# Patient Record
Sex: MALE | Race: WHITE | NOT HISPANIC OR LATINO | Employment: OTHER | URBAN - METROPOLITAN AREA
[De-identification: names, ages, dates, MRNs, and addresses within clinical notes are randomized per-mention and may not be internally consistent; named-entity substitution may affect disease eponyms.]

---

## 2018-02-22 ENCOUNTER — OFFICE VISIT CONVERTED (OUTPATIENT)
Dept: OTOLARYNGOLOGY | Facility: CLINIC | Age: 73
End: 2018-02-22
Attending: OTOLARYNGOLOGY

## 2018-06-01 VITALS
DIASTOLIC BLOOD PRESSURE: 81 MMHG | DIASTOLIC BLOOD PRESSURE: 78 MMHG | OXYGEN SATURATION: 100 % | HEART RATE: 82 BPM | DIASTOLIC BLOOD PRESSURE: 64 MMHG | RESPIRATION RATE: 43 BRPM | RESPIRATION RATE: 12 BRPM | OXYGEN SATURATION: 99 % | RESPIRATION RATE: 16 BRPM | TEMPERATURE: 97.6 F | RESPIRATION RATE: 10 BRPM | DIASTOLIC BLOOD PRESSURE: 53 MMHG | RESPIRATION RATE: 13 BRPM | OXYGEN SATURATION: 98 % | SYSTOLIC BLOOD PRESSURE: 102 MMHG | HEART RATE: 74 BPM | DIASTOLIC BLOOD PRESSURE: 71 MMHG | SYSTOLIC BLOOD PRESSURE: 157 MMHG | HEART RATE: 68 BPM | SYSTOLIC BLOOD PRESSURE: 126 MMHG | HEART RATE: 78 BPM | TEMPERATURE: 98 F | SYSTOLIC BLOOD PRESSURE: 101 MMHG | DIASTOLIC BLOOD PRESSURE: 58 MMHG | HEART RATE: 75 BPM | SYSTOLIC BLOOD PRESSURE: 136 MMHG | SYSTOLIC BLOOD PRESSURE: 148 MMHG | WEIGHT: 160 LBS | HEART RATE: 66 BPM | HEIGHT: 67 IN | RESPIRATION RATE: 18 BRPM | DIASTOLIC BLOOD PRESSURE: 74 MMHG

## 2018-06-04 ENCOUNTER — AMBULATORY SURGICAL CENTER (AMBULATORY)
Dept: URBAN - METROPOLITAN AREA SURGERY 17 | Facility: SURGERY | Age: 73
End: 2018-06-04

## 2018-06-04 ENCOUNTER — OFFICE (AMBULATORY)
Dept: URBAN - METROPOLITAN AREA PATHOLOGY 4 | Facility: PATHOLOGY | Age: 73
End: 2018-06-04

## 2018-06-04 DIAGNOSIS — D12.3 BENIGN NEOPLASM OF TRANSVERSE COLON: ICD-10-CM

## 2018-06-04 DIAGNOSIS — Z86.010 PERSONAL HISTORY OF COLONIC POLYPS: ICD-10-CM

## 2018-06-04 DIAGNOSIS — D12.0 BENIGN NEOPLASM OF CECUM: ICD-10-CM

## 2018-06-04 DIAGNOSIS — K62.1 RECTAL POLYP: ICD-10-CM

## 2018-06-04 LAB
GI HISTOLOGY: A. UNSPECIFIED: (no result)
GI HISTOLOGY: B. UNSPECIFIED: (no result)
GI HISTOLOGY: C. UNSPECIFIED: (no result)
GI HISTOLOGY: PDF REPORT: (no result)

## 2018-06-04 PROCEDURE — 88305 TISSUE EXAM BY PATHOLOGIST: CPT | Mod: PT

## 2018-06-04 PROCEDURE — 45385 COLONOSCOPY W/LESION REMOVAL: CPT | Mod: PT

## 2018-06-04 RX ADMIN — PROPOFOL 25 MG: 10 INJECTION, EMULSION INTRAVENOUS at 10:18

## 2018-06-04 RX ADMIN — PROPOFOL 75 MG: 10 INJECTION, EMULSION INTRAVENOUS at 10:11

## 2018-06-04 RX ADMIN — PROPOFOL 25 MG: 10 INJECTION, EMULSION INTRAVENOUS at 10:22

## 2018-06-04 RX ADMIN — PROPOFOL 25 MG: 10 INJECTION, EMULSION INTRAVENOUS at 10:16

## 2018-06-04 RX ADMIN — PROPOFOL 25 MG: 10 INJECTION, EMULSION INTRAVENOUS at 10:13

## 2018-06-04 RX ADMIN — PROPOFOL 25 MG: 10 INJECTION, EMULSION INTRAVENOUS at 10:14

## 2018-06-04 RX ADMIN — PROPOFOL 25 MG: 10 INJECTION, EMULSION INTRAVENOUS at 10:20

## 2019-03-25 ENCOUNTER — HOSPITAL ENCOUNTER (OUTPATIENT)
Dept: OTHER | Facility: HOSPITAL | Age: 74
Discharge: HOME | End: 2019-03-25
Attending: FAMILY MEDICINE

## 2019-08-15 ENCOUNTER — HOSPITAL ENCOUNTER (OUTPATIENT)
Dept: OTHER | Facility: HOSPITAL | Age: 74
Discharge: HOME | End: 2019-08-15
Attending: FAMILY MEDICINE

## 2019-08-15 LAB
CREAT BLD-MCNC: 1 MG/DL (ref 0.6–1.4)
GFR SERPLBLD BASED ON 1.73 SQ M-ARVRAT: >60 ML/MIN/{1.73_M2}

## 2019-11-07 ENCOUNTER — HOSPITAL ENCOUNTER (OUTPATIENT)
Dept: OTHER | Facility: HOSPITAL | Age: 74
Discharge: HOME | End: 2019-11-07

## 2019-11-07 LAB
CREAT BLD-MCNC: 0.9 MG/DL (ref 0.6–1.4)
GFR SERPLBLD BASED ON 1.73 SQ M-ARVRAT: >60 ML/MIN/{1.73_M2}

## 2019-12-18 ENCOUNTER — HOSPITAL ENCOUNTER (OUTPATIENT)
Dept: NUCLEAR MEDICINE | Facility: HOSPITAL | Age: 74
Discharge: HOME | End: 2019-12-18
Attending: FAMILY MEDICINE

## 2020-01-09 ENCOUNTER — HOSPITAL ENCOUNTER (OUTPATIENT)
Dept: OTHER | Facility: HOSPITAL | Age: 75
Discharge: HOME | End: 2020-01-09
Attending: FAMILY MEDICINE

## 2020-01-20 ENCOUNTER — HOSPITAL ENCOUNTER (OUTPATIENT)
Dept: OTHER | Facility: HOSPITAL | Age: 75
Discharge: HOME | End: 2020-01-20
Attending: FAMILY MEDICINE

## 2020-02-12 ENCOUNTER — HOSPITAL ENCOUNTER (OUTPATIENT)
Dept: OTHER | Facility: HOSPITAL | Age: 75
Discharge: HOME | End: 2020-02-12
Attending: FAMILY MEDICINE

## 2020-02-20 ENCOUNTER — OFFICE VISIT CONVERTED (OUTPATIENT)
Dept: OTOLARYNGOLOGY | Facility: CLINIC | Age: 75
End: 2020-02-20
Attending: OTOLARYNGOLOGY

## 2020-07-23 ENCOUNTER — HOSPITAL ENCOUNTER (OUTPATIENT)
Dept: OTHER | Facility: HOSPITAL | Age: 75
Discharge: HOME | End: 2020-07-23
Attending: FAMILY MEDICINE

## 2021-05-15 VITALS
TEMPERATURE: 97.7 F | BODY MASS INDEX: 24.98 KG/M2 | RESPIRATION RATE: 16 BRPM | OXYGEN SATURATION: 99 % | SYSTOLIC BLOOD PRESSURE: 124 MMHG | HEART RATE: 71 BPM | WEIGHT: 159.12 LBS | DIASTOLIC BLOOD PRESSURE: 68 MMHG | HEIGHT: 67 IN

## 2021-05-16 VITALS
SYSTOLIC BLOOD PRESSURE: 148 MMHG | DIASTOLIC BLOOD PRESSURE: 82 MMHG | HEART RATE: 82 BPM | RESPIRATION RATE: 16 BRPM | OXYGEN SATURATION: 97 % | TEMPERATURE: 98.3 F

## 2023-06-15 ENCOUNTER — HOSPITAL ENCOUNTER (OUTPATIENT)
Dept: CT IMAGING | Facility: HOSPITAL | Age: 78
Discharge: HOME | End: 2023-06-15
Admitting: NURSE PRACTITIONER
Payer: MEDICARE

## 2023-06-15 ENCOUNTER — TRANSCRIBE ORDERS (OUTPATIENT)
Dept: ADMINISTRATIVE | Facility: HOSPITAL | Age: 78
End: 2023-06-15
Payer: MEDICARE

## 2023-06-15 ENCOUNTER — TRANSCRIBE ORDERS (OUTPATIENT)
Dept: CT IMAGING | Facility: HOSPITAL | Age: 78
End: 2023-06-15
Payer: MEDICARE

## 2023-06-15 DIAGNOSIS — R10.32 LEFT LOWER QUADRANT PAIN: ICD-10-CM

## 2023-06-15 DIAGNOSIS — R10.32 LEFT LOWER QUADRANT PAIN: Primary | ICD-10-CM

## 2023-06-15 LAB
CREAT BLDA-MCNC: 1 MG/DL
EGFRCR SERPLBLD CKD-EPI 2021: 77 ML/MIN/1.73

## 2023-06-15 PROCEDURE — 82565 ASSAY OF CREATININE: CPT

## 2023-06-15 PROCEDURE — 74177 CT ABD & PELVIS W/CONTRAST: CPT

## 2023-06-15 PROCEDURE — 25510000001 IOPAMIDOL PER 1 ML: Performed by: NURSE PRACTITIONER

## 2023-06-15 RX ADMIN — IOPAMIDOL 100 ML: 755 INJECTION, SOLUTION INTRAVENOUS at 11:00

## 2023-08-23 ENCOUNTER — OFFICE VISIT (OUTPATIENT)
Dept: SURGERY | Facility: CLINIC | Age: 78
End: 2023-08-23
Payer: MEDICARE

## 2023-08-23 ENCOUNTER — PREP FOR SURGERY (OUTPATIENT)
Dept: OTHER | Facility: HOSPITAL | Age: 78
End: 2023-08-23
Payer: MEDICARE

## 2023-08-23 VITALS — WEIGHT: 153 LBS | HEIGHT: 67 IN | HEART RATE: 74 BPM | BODY MASS INDEX: 24.01 KG/M2

## 2023-08-23 DIAGNOSIS — Z86.010 HISTORY OF COLONIC POLYPS: ICD-10-CM

## 2023-08-23 DIAGNOSIS — Z87.19 HISTORY OF DIVERTICULITIS: ICD-10-CM

## 2023-08-23 DIAGNOSIS — Z12.11 SCREENING FOR MALIGNANT NEOPLASM OF COLON: Primary | ICD-10-CM

## 2023-08-23 PROCEDURE — 1160F RVW MEDS BY RX/DR IN RCRD: CPT | Performed by: NURSE PRACTITIONER

## 2023-08-23 PROCEDURE — S0260 H&P FOR SURGERY: HCPCS | Performed by: NURSE PRACTITIONER

## 2023-08-23 PROCEDURE — 1159F MED LIST DOCD IN RCRD: CPT | Performed by: NURSE PRACTITIONER

## 2023-08-23 RX ORDER — SODIUM CHLORIDE 0.9 % (FLUSH) 0.9 %
3 SYRINGE (ML) INJECTION EVERY 12 HOURS SCHEDULED
OUTPATIENT
Start: 2023-08-23

## 2023-08-23 RX ORDER — LEVOTHYROXINE SODIUM 0.07 MG/1
1 TABLET ORAL DAILY
COMMUNITY
Start: 2023-06-05

## 2023-08-23 RX ORDER — LISINOPRIL 20 MG/1
1 TABLET ORAL DAILY
COMMUNITY
Start: 2023-06-12

## 2023-08-23 RX ORDER — SIMVASTATIN 10 MG
TABLET ORAL
COMMUNITY

## 2023-08-23 RX ORDER — CIPROFLOXACIN 500 MG/1
1 TABLET, FILM COATED ORAL EVERY 12 HOURS SCHEDULED
COMMUNITY
Start: 2023-06-15

## 2023-08-23 RX ORDER — PANTOPRAZOLE SODIUM 40 MG/1
1 TABLET, DELAYED RELEASE ORAL DAILY
COMMUNITY
Start: 2023-06-05

## 2023-08-23 RX ORDER — SODIUM PICOSULFATE, MAGNESIUM OXIDE, AND ANHYDROUS CITRIC ACID 10; 3.5; 12 MG/160ML; G/160ML; G/160ML
160 LIQUID ORAL ONCE
Qty: 2 ML | Refills: 0 | Status: SHIPPED | OUTPATIENT
Start: 2023-08-23 | End: 2023-08-23

## 2023-08-23 RX ORDER — OMEPRAZOLE 20 MG/1
20 CAPSULE, DELAYED RELEASE ORAL DAILY
COMMUNITY

## 2023-08-23 RX ORDER — SODIUM CHLORIDE 0.9 % (FLUSH) 0.9 %
10 SYRINGE (ML) INJECTION AS NEEDED
OUTPATIENT
Start: 2023-08-23

## 2023-08-23 RX ORDER — SODIUM CHLORIDE 9 MG/ML
40 INJECTION, SOLUTION INTRAVENOUS AS NEEDED
OUTPATIENT
Start: 2023-08-23

## 2023-08-23 NOTE — PROGRESS NOTES
Chief Complaint: Colonoscopy (consult)    Subjective      Colonoscopy consultation       History of Present Illness  Kennedy Cruz is a 78 y.o. male presents to [facility] for colonoscopy consultation.    Patient presents today on referral from Dr. Matthew Sampson for colonoscopy consultation.    Patient reports that he has had a diverticulitis flareup in June 2023.  He reports that he was treated by Dr. Sampson with Flagyl and Cipro.  He currently denies any abdominal pain, change in bowel habit, or rectal bleeding.  Denies any family history of colorectal cancer.  Admits to history of colonic polyps.    6/18: colonoscopy ([facility]): sub-cm adenomas removed.       Objective     Past Medical History:   Diagnosis Date    Acid reflux     Hyperlipidemia     Hypertension     Hypothyroid        Past Surgical History:   Procedure Laterality Date    APPENDECTOMY      HERNIA REPAIR         Outpatient Medications Marked as Taking for the 8/23/23 encounter (Office Visit) with Madelyn Bullock APRN   Medication Sig Dispense Refill    levothyroxine (SYNTHROID, LEVOTHROID) 75 MCG tablet Take 1 tablet by mouth Daily.      lisinopril (PRINIVIL,ZESTRIL) 20 MG tablet Take 1 tablet by mouth Daily.      pantoprazole (PROTONIX) 40 MG EC tablet Take 1 tablet by mouth Daily.      simvastatin (ZOCOR) 10 MG tablet simvastatin 10 mg oral tablet take 1 tablet (10 mg) by oral route once daily in the evening   Active         Allergies   Allergen Reactions    Morphine Seizure        Family History   Problem Relation Age of Onset    Hypertension Mother     Hypertension Father     Hypertension Sister        Social History     Socioeconomic History    Marital status:    Tobacco Use    Smoking status: Former     Types: Pipe    Smokeless tobacco: Never   Vaping Use    Vaping Use: Never used   Substance and Sexual Activity    Alcohol use: Yes     Comment: occ.    Drug use: Never    Sexual activity: Defer  "      Review of Systems   Constitutional:  Negative for chills and fever.   Gastrointestinal:  Negative for abdominal distention, abdominal pain, anal bleeding, blood in stool, constipation, diarrhea and rectal pain.      Vital Signs:   Pulse 74   Ht 170.2 cm (67\")   Wt 69.4 kg (153 lb)   BMI 23.96 kg/mý      Physical Exam  Vitals and nursing note reviewed.   Constitutional:       General: He is not in acute distress.     Appearance: Normal appearance.   HENT:      Head: Normocephalic.   Cardiovascular:      Rate and Rhythm: Normal rate.   Pulmonary:      Effort: Pulmonary effort is normal.      Breath sounds: No stridor.   Abdominal:      Palpations: Abdomen is soft.      Tenderness: There is no guarding.   Musculoskeletal:         General: No deformity. Normal range of motion.   Skin:     General: Skin is warm and dry.      Coloration: Skin is not jaundiced.   Neurological:      General: No focal deficit present.      Mental Status: He is alert and oriented to person, place, and time.   Psychiatric:         Mood and Affect: Mood normal.         Thought Content: Thought content normal.        Result Review :          []  Laboratory  []  Radiology  []  Pathology  []  Microbiology  []  EKG/Telemetry   []  Cardiology/Vascular   []  Old records  I spent 15 minutes caring for Kennedy on this date of service. This time includes time spent by me in the following activities: reviewing tests, obtaining and/or reviewing a separately obtained history, performing a medically appropriate examination and/or evaluation, ordering medications, tests, or procedures, and documenting information in the medical record.     Assessment and Plan    Diagnoses and all orders for this visit:    1. Screening for malignant neoplasm of colon (Primary)    2. History of colonic polyps    3. History of diverticulitis    Other orders  -     Sod Picosulfate-Mag Ox-Cit Acd (Clenpiq) 10-3.5-12 MG-GM -GM/160ML solution; Take 160 mL by mouth 1 (One) " Time for 1 dose.  Dispense: 2 mL; Refill: 0        Follow Up   Return for Schedule colonoscopy with Dr. Suarez on 1/17/2024 [facility], [place], operated by [facility].    Hospital arrival time: 10:00.    Possible risks/complications, benefits, and alternatives to surgical or invasive procedures have been explained to patient and/or legal guardian.    Patient has been evaluated and can tolerate anesthesia and/or sedation. Risks, benefits, and alternatives to anesthesia and sedation have been explained to the patient and/or legal guardian. Patient verbalizes understanding and is willing to proceed with the above plan.     Patient was given instructions and counseling regarding his condition or for health maintenance advice. Please see specific information pulled into the AVS if appropriate.

## 2023-10-11 ENCOUNTER — TRANSCRIBE ORDERS (OUTPATIENT)
Dept: ADMINISTRATIVE | Facility: HOSPITAL | Age: 78
End: 2023-10-11
Payer: MEDICARE

## 2023-10-11 DIAGNOSIS — R07.9 CHEST PAIN, UNSPECIFIED TYPE: Primary | ICD-10-CM

## 2023-10-23 ENCOUNTER — HOSPITAL ENCOUNTER (OUTPATIENT)
Dept: CT IMAGING | Facility: HOSPITAL | Age: 78
Discharge: HOME | End: 2023-10-23
Admitting: FAMILY MEDICINE
Payer: MEDICARE

## 2023-10-23 DIAGNOSIS — R07.9 CHEST PAIN, UNSPECIFIED TYPE: ICD-10-CM

## 2023-10-23 LAB
CREAT BLDA-MCNC: 1 MG/DL
EGFRCR SERPLBLD CKD-EPI 2021: 77 ML/MIN/1.73

## 2023-10-23 PROCEDURE — 25510000001 IOPAMIDOL PER 1 ML: Performed by: FAMILY MEDICINE

## 2023-10-23 PROCEDURE — 71260 CT THORAX DX C+: CPT

## 2023-10-23 PROCEDURE — 82565 ASSAY OF CREATININE: CPT

## 2023-10-23 RX ADMIN — IOPAMIDOL 100 ML: 755 INJECTION, SOLUTION INTRAVENOUS at 18:53

## 2024-01-10 NOTE — SIGNIFICANT NOTE
Arrival time of 1000.    Must have a  for transportation post prodecure, as well as photo ID and insurance card.    Education provided on laxative administration; bowel prep to be taken in two doses. Reviweed diet instructions for day prior to procedure. Only plain, unflavored water after midnight until two hours prior to arrival time.    Do not take any morning medications of the day and the procedure. Instead, bring all prescribed medications and inhalers to the hospital on the morning of the procedure.     Patient verbalized understanding of instructions.

## 2024-01-12 ENCOUNTER — TELEPHONE (OUTPATIENT)
Dept: SURGERY | Facility: CLINIC | Age: 79
End: 2024-01-12
Payer: MEDICARE

## 2024-01-12 NOTE — TELEPHONE ENCOUNTER
Called pt wife back and r/s his colonoscopy to 2-14-24 with hilda in surgery scheduling. His new arrival time is 1:30pm. Pt has bowel prep instructions with no questions.

## 2024-02-07 ENCOUNTER — TELEPHONE (OUTPATIENT)
Dept: SURGERY | Facility: CLINIC | Age: 79
End: 2024-02-07
Payer: MEDICARE

## 2024-02-07 NOTE — TELEPHONE ENCOUNTER
PATIENT'S WIFE, CAMERON, CALLED.  SHE WANTS TO KNOW IF THERE IS AN EARLIER TIME THAN 1:30 THE SAME DAY HE IS SCHEDULED ON 12/14/24.    SHE SAID IT IS A LONG TIME FOR HIM TO GO WITHOUT EATING.

## 2024-02-07 NOTE — TELEPHONE ENCOUNTER
Called pt wife back. There is no earlier time on 2-14 at this time. I told her I would call them back if there was a cancellation so they could move up.

## 2024-02-09 NOTE — PRE-PROCEDURE INSTRUCTIONS
"Instructed on date and arrival time of 1330. Come to entrance \"C\". Must have  over age 18 to drive home.  May have two visitors; however, children under 12 must stay in waiting room.  Discussed clear liquid diet (no red or purple), bowel prep, and NPO.  May take medications as usual except for blood thinners, diabetic medications, and weight loss medications.  Bring list of medications.  Verbalized understanding of instructions given.  Instructed to call for questions or concerns.  Spoke with wife.  "

## 2024-02-13 ENCOUNTER — ANESTHESIA EVENT (OUTPATIENT)
Dept: GASTROENTEROLOGY | Facility: HOSPITAL | Age: 79
End: 2024-02-13
Payer: MEDICARE

## 2024-02-14 ENCOUNTER — ANESTHESIA (OUTPATIENT)
Dept: GASTROENTEROLOGY | Facility: HOSPITAL | Age: 79
End: 2024-02-14
Payer: MEDICARE

## 2024-02-14 ENCOUNTER — HOSPITAL ENCOUNTER (OUTPATIENT)
Facility: HOSPITAL | Age: 79
Setting detail: HOSPITAL OUTPATIENT SURGERY
Discharge: HOME | End: 2024-02-14
Attending: SURGERY | Admitting: SURGERY
Payer: MEDICARE

## 2024-02-14 VITALS
BODY MASS INDEX: 24.1 KG/M2 | HEART RATE: 103 BPM | TEMPERATURE: 98.6 F | SYSTOLIC BLOOD PRESSURE: 94 MMHG | RESPIRATION RATE: 16 BRPM | WEIGHT: 153.88 LBS | DIASTOLIC BLOOD PRESSURE: 57 MMHG | OXYGEN SATURATION: 97 %

## 2024-02-14 DIAGNOSIS — Z87.19 HISTORY OF DIVERTICULITIS: ICD-10-CM

## 2024-02-14 DIAGNOSIS — Z12.11 SCREENING FOR MALIGNANT NEOPLASM OF COLON: ICD-10-CM

## 2024-02-14 PROCEDURE — 25810000003 LACTATED RINGERS PER 1000 ML: Performed by: NURSE ANESTHETIST, CERTIFIED REGISTERED

## 2024-02-14 PROCEDURE — 25810000003 LACTATED RINGERS PER 1000 ML

## 2024-02-14 PROCEDURE — 25010000002 PROPOFOL 10 MG/ML EMULSION: Performed by: NURSE ANESTHETIST, CERTIFIED REGISTERED

## 2024-02-14 PROCEDURE — 88305 TISSUE EXAM BY PATHOLOGIST: CPT | Performed by: SURGERY

## 2024-02-14 DEVICE — DEV CLIP HEMO RESOLUTION360/ULTR 235CM 17MM STRL: Type: IMPLANTABLE DEVICE | Site: COLON | Status: FUNCTIONAL

## 2024-02-14 DEVICE — DEV CLIP ENDO RESOLUTION360 CONTRL ROT 235CM: Type: IMPLANTABLE DEVICE | Site: COLON | Status: FUNCTIONAL

## 2024-02-14 RX ORDER — LIDOCAINE HYDROCHLORIDE 20 MG/ML
INJECTION, SOLUTION EPIDURAL; INFILTRATION; INTRACAUDAL; PERINEURAL AS NEEDED
Status: DISCONTINUED | OUTPATIENT
Start: 2024-02-14 | End: 2024-02-14 | Stop reason: SURG

## 2024-02-14 RX ORDER — PROPOFOL 10 MG/ML
VIAL (ML) INTRAVENOUS AS NEEDED
Status: DISCONTINUED | OUTPATIENT
Start: 2024-02-14 | End: 2024-02-14 | Stop reason: SURG

## 2024-02-14 RX ORDER — SODIUM CHLORIDE 9 MG/ML
40 INJECTION, SOLUTION INTRAVENOUS AS NEEDED
Status: DISCONTINUED | OUTPATIENT
Start: 2024-02-14 | End: 2024-02-14 | Stop reason: HOSPADM

## 2024-02-14 RX ORDER — SODIUM CHLORIDE, SODIUM LACTATE, POTASSIUM CHLORIDE, CALCIUM CHLORIDE 600; 310; 30; 20 MG/100ML; MG/100ML; MG/100ML; MG/100ML
30 INJECTION, SOLUTION INTRAVENOUS CONTINUOUS
Status: DISCONTINUED | OUTPATIENT
Start: 2024-02-14 | End: 2024-02-14 | Stop reason: HOSPADM

## 2024-02-14 RX ORDER — SODIUM CHLORIDE, SODIUM LACTATE, POTASSIUM CHLORIDE, CALCIUM CHLORIDE 600; 310; 30; 20 MG/100ML; MG/100ML; MG/100ML; MG/100ML
INJECTION, SOLUTION INTRAVENOUS CONTINUOUS PRN
Status: DISCONTINUED | OUTPATIENT
Start: 2024-02-14 | End: 2024-02-14 | Stop reason: SURG

## 2024-02-14 RX ORDER — PHENYLEPHRINE HCL IN 0.9% NACL 1 MG/10 ML
SYRINGE (ML) INTRAVENOUS AS NEEDED
Status: DISCONTINUED | OUTPATIENT
Start: 2024-02-14 | End: 2024-02-14 | Stop reason: SURG

## 2024-02-14 RX ORDER — SODIUM CHLORIDE 0.9 % (FLUSH) 0.9 %
3 SYRINGE (ML) INJECTION EVERY 12 HOURS SCHEDULED
Status: DISCONTINUED | OUTPATIENT
Start: 2024-02-14 | End: 2024-02-14 | Stop reason: HOSPADM

## 2024-02-14 RX ORDER — SODIUM CHLORIDE 0.9 % (FLUSH) 0.9 %
10 SYRINGE (ML) INJECTION AS NEEDED
Status: DISCONTINUED | OUTPATIENT
Start: 2024-02-14 | End: 2024-02-14 | Stop reason: HOSPADM

## 2024-02-14 RX ORDER — EPHEDRINE SULFATE 50 MG/ML
INJECTION, SOLUTION INTRAVENOUS AS NEEDED
Status: DISCONTINUED | OUTPATIENT
Start: 2024-02-14 | End: 2024-02-14 | Stop reason: SURG

## 2024-02-14 RX ADMIN — Medication 300 MCG: at 14:25

## 2024-02-14 RX ADMIN — LIDOCAINE HYDROCHLORIDE 30 MG: 20 INJECTION, SOLUTION EPIDURAL; INFILTRATION; INTRACAUDAL; PERINEURAL at 14:08

## 2024-02-14 RX ADMIN — Medication 300 MCG: at 14:29

## 2024-02-14 RX ADMIN — PROPOFOL 50 MG: 10 INJECTION, EMULSION INTRAVENOUS at 14:12

## 2024-02-14 RX ADMIN — Medication 200 MCG: at 14:20

## 2024-02-14 RX ADMIN — SODIUM CHLORIDE, POTASSIUM CHLORIDE, SODIUM LACTATE AND CALCIUM CHLORIDE: 600; 310; 30; 20 INJECTION, SOLUTION INTRAVENOUS at 14:02

## 2024-02-14 RX ADMIN — PROPOFOL 100 MG: 10 INJECTION, EMULSION INTRAVENOUS at 14:08

## 2024-02-14 RX ADMIN — Medication 200 MCG: at 14:16

## 2024-02-14 RX ADMIN — EPHEDRINE SULFATE 10 MG: 50 INJECTION INTRAVENOUS at 14:29

## 2024-02-14 RX ADMIN — PROPOFOL 150 MCG/KG/MIN: 10 INJECTION, EMULSION INTRAVENOUS at 14:07

## 2024-02-14 RX ADMIN — SODIUM CHLORIDE, POTASSIUM CHLORIDE, SODIUM LACTATE AND CALCIUM CHLORIDE 30 ML/HR: 600; 310; 30; 20 INJECTION, SOLUTION INTRAVENOUS at 13:37

## 2024-02-14 NOTE — NURSING NOTE
Patient stated he took a medication this morning for his inflamed prostate.  RN asked patient if the medication was Flomax.  Patient stated that the name did not sound familiar.  Anesthesia notified of unknown medication.      Maggie Mcgill RN 13:50 EST 2/14/2024

## 2024-02-14 NOTE — H&P
Chief Complaint: Colonoscopy (consult)    Subjective      Colonoscopy consultation       History of Present Illness  Patient is here to have a colonoscopy.  Following is a copy of the HPI from the patient's office visit at the surgery clinic.    Kennedy Cruz is a 78 y.o. male presents to [facility] for colonoscopy consultation.    Patient presents today on referral from Dr. Matthew Sampson for colonoscopy consultation.    Patient reports that he has had a diverticulitis flareup in June 2023.  He reports that he was treated by Dr. Sampson with Flagyl and Cipro.  He currently denies any abdominal pain, change in bowel habit, or rectal bleeding.  Denies any family history of colorectal cancer.  Admits to history of colonic polyps.    6/18: colonoscopy ([facility]): sub-cm adenomas removed.     Clenpiq    Objective     Past Medical History:   Diagnosis Date    Acid reflux     Hyperlipidemia     Hypertension     Hypothyroid        Past Surgical History:   Procedure Laterality Date    APPENDECTOMY      HERNIA REPAIR         Outpatient Medications Marked as Taking for the 8/23/23 encounter (Office Visit) with Kobe April, APRN   Medication Sig Dispense Refill    levothyroxine (SYNTHROID, LEVOTHROID) 75 MCG tablet Take 1 tablet by mouth Daily.      lisinopril (PRINIVIL,ZESTRIL) 20 MG tablet Take 1 tablet by mouth Daily.      pantoprazole (PROTONIX) 40 MG EC tablet Take 1 tablet by mouth Daily.      simvastatin (ZOCOR) 10 MG tablet simvastatin 10 mg oral tablet take 1 tablet (10 mg) by oral route once daily in the evening   Active         Allergies   Allergen Reactions    Morphine Seizure        Family History   Problem Relation Age of Onset    Hypertension Mother     Hypertension Father     Hypertension Sister        Social History     Socioeconomic History    Marital status:    Tobacco Use    Smoking status: Former     Types: Pipe    Smokeless tobacco: Never   Vaping Use    Vaping  Use: Never used   Substance and Sexual Activity    Alcohol use: Yes     Comment: occ.    Drug use: Never    Sexual activity: Defer       Physical Exam  Vitals - Available in the EMR.   Respiratory:  breathing not labored, respiratory effort appears normal  Cardiovascular:  heart regular rate  Skin and subcutaneous tissue:  warm and dry  Musculoskeletal: moving all extremities symmetrically and purposefully  Neurologic:  no obvious motor or sensory deficits, speech clear  Psychiatric:  judgment and insight intact, mood normal      Assessment   Screening colonoscopy  Personal history of colon polyps  History of diverticulitis    Plan  Colonoscopy    Risks and benefits discussed    Paul Suarez M.D.  02/13/24    Electronically signed by Paul Suarez MD, 02/13/24, 8:20 PM EST.

## 2024-02-16 LAB
CYTO UR: NORMAL
LAB AP CASE REPORT: NORMAL
LAB AP CLINICAL INFORMATION: NORMAL
PATH REPORT.FINAL DX SPEC: NORMAL
PATH REPORT.GROSS SPEC: NORMAL

## 2024-05-11 PROBLEM — C61 PROSTATE CANCER: Status: ACTIVE | Noted: 2024-05-11

## 2024-05-11 NOTE — PROGRESS NOTES
Chief Complaint: Urologic complaint    Subjective         History of Present Illness  Kennedy Cruz is a 79 y.o. male           Prostatic adenocarcinoma clinical T1c      10/23 CT chest with - multifocal groundglass infiltrates lower lobe right  6/23 CT abdomen/pelvis with -    diverticulitis without abscess    Voiding okay.  No straining.  No prostate meds  Nocturia x 2    No GH    No history of kidney  stone.    Uncle with prostate cancer in    No cardiopulmonary history  Non-smoker  No anticoagulation    Recurrent prostatitis more in the last few years -causes pelvic pain -usually takes a couple months of antibiotics to fix this  Patient has history of recurrent prostatitis, he did have a lot of trouble after his last biopsy    2/15 excision of left epididymal cyst    Several uncles lived to be in their 80s       Prostate CA      3/24    4.9  4/23    3.9  5/22 prostate biopsy -31 g  - first urology -right apex-3+3  3/22   5.4  11/16   3.7  10/15 3.2            Objective     Past Medical History:   Diagnosis Date    Acid reflux     Hyperlipidemia     Hypertension     Hypothyroid              Social History     Socioeconomic History    Marital status:    Tobacco Use    Smoking status: Former     Types: Pipe    Smokeless tobacco: Never   Vaping Use    Vaping status: Never Used   Substance and Sexual Activity    Alcohol use: Yes     Comment: occ.    Drug use: Never    Sexual activity: Defer       Vital Signs:   There were no vitals taken for this visit.                 Assessment and Plan    There are no diagnoses linked to this encounter.       Prostate cancer      Records reviewed and summarized in the chart      We discussed that active surveillance is an option for a patient with low risk prostate cancer.  The risks of surveillance include progression of disease, failure of clinical staging to detect advanced disease, need for strict followup, need for repeat prostate biopsies, and patient anxiety  related to PSA.  We did discuss that the evidence suggests that patient's who progress to treatment on surveillance have survival similar to those treated at diagnosis.    Follow-up with MRI prostate

## 2024-05-14 ENCOUNTER — OFFICE VISIT (OUTPATIENT)
Dept: UROLOGY | Facility: CLINIC | Age: 79
End: 2024-05-14
Payer: MEDICARE

## 2024-05-14 VITALS — WEIGHT: 153 LBS | BODY MASS INDEX: 24.01 KG/M2 | HEIGHT: 67 IN

## 2024-05-14 DIAGNOSIS — C61 PROSTATE CANCER: Primary | ICD-10-CM

## 2024-05-15 ENCOUNTER — TELEPHONE (OUTPATIENT)
Dept: UROLOGY | Facility: CLINIC | Age: 79
End: 2024-05-15
Payer: MEDICARE

## 2024-05-15 DIAGNOSIS — N41.0 ACUTE PROSTATITIS: Primary | ICD-10-CM

## 2024-05-15 NOTE — TELEPHONE ENCOUNTER
PATIENT'S WIFE, CAMERON, CALLED.  PATIENT HAS PROSTATITIS AND IT IS ACTING UP TODAY.  HE IS HAVING PAIN.  SHE SAID THEY WERE TOLD TO CALL IF IT HAPPENED AND AN ANTIBIOTIC WOULD BE SENT IN.    PHARMACY VERIFIED.

## 2024-05-15 NOTE — TELEPHONE ENCOUNTER
Spoke to pts wife. They requested a my chart message sent as well with this information.MRI prostate scheduled 6/12/24 at 10:40am,ar[facility] 10:10am [facility] ([address] No prep required.

## 2024-05-16 RX ORDER — DOXYCYCLINE HYCLATE 100 MG/1
100 CAPSULE ORAL 2 TIMES DAILY
Qty: 42 CAPSULE | Refills: 0 | Status: SHIPPED | OUTPATIENT
Start: 2024-05-16 | End: 2024-06-06

## 2024-05-16 NOTE — TELEPHONE ENCOUNTER
Notified pt spouse that we are sending in Doxycyline for pt to take twice daily x3 weeks. Be careful not to get a bad sunburn and do not drink alcohol while on this medication. Pharmacy verified and medication sent. Pt spouse verbalized understanding via teach back

## 2024-06-12 ENCOUNTER — TELEPHONE (OUTPATIENT)
Dept: SURGERY | Facility: CLINIC | Age: 79
End: 2024-06-12
Payer: MEDICARE

## 2024-06-12 NOTE — TELEPHONE ENCOUNTER
Angelito from [facility] stating he performed a MRI on patient today and the clips from colonoscopy in 2/2024 are still in place. He stated they caused a lot of distortion on the films and would consider redoing the test if the clips are going to fall out? Dr. Elder ordered the MRI.    ANGELITO [phone] ext [phone]

## 2024-06-13 NOTE — TELEPHONE ENCOUNTER
Patient's wife called to make sure there is nothing they need to be doing at this time since the clips are still in place.

## 2024-06-14 NOTE — TELEPHONE ENCOUNTER
Notified pt spouse of Dr Elder recommendations. She verbalized understanding and they will follow up as scheduled to discuss this further

## 2024-06-21 NOTE — PROGRESS NOTES
Chief Complaint: Urologic complaint    Subjective         History of Present Illness  Kennedy Cruz is a 79 y.o. male           Prostatic adenocarcinoma clinical T1c    Follows up after MRI.    5/14/2024 called in with prostatitis -doxycycline 100 mg p.o. twice daily x 3 weeks -symptoms abated      10/23 CT chest with - multifocal groundglass infiltrates lower lobe right  6/23 CT abdomen/pelvis with -    diverticulitis without abscess    Voiding okay.  No straining.  No prostate meds, no change  Nocturia x 2    No dysuria     No cardiopulmonary history  Non-smoker  No anticoagulation        Previous    No history of kidney  stone.    Uncle with prostate cancer in    Recurrent prostatitis more in the last few years -causes pelvic pain -usually takes a couple months of antibiotics to fix this  Patient has history of recurrent prostatitis, he did have a lot of trouble after his last biopsy    2/15 excision of left epididymal cyst    Several uncles lived to be in their 80s       Prostate CA    6/12/2024 General surgery/[facility] stated clips would probably  fall off  in a few months    6/24 MRI prostate - 45 g, PSAd 0.1 -negative, but a lot of artifact causing limitation from endoscopic clips in the colon.  MRI is not a good option for future screening examination on this patient  3/24    4.9  4/23    3.9  5/22 prostate biopsy -31 g  - first urology -right apex-3+3  3/22   5.4  11/16   3.7  10/15 3.2            Objective     Past Medical History:   Diagnosis Date    Acid reflux     Hyperlipidemia     Hypertension     Hypothyroid              Social History     Socioeconomic History    Marital status:    Tobacco Use    Smoking status: Former     Types: Pipe    Smokeless tobacco: Never   Vaping Use    Vaping status: Never Used   Substance and Sexual Activity    Alcohol use: Yes     Comment: occ.    Drug use: Never    Sexual activity: Defer       Vital Signs:   There were no vitals taken for this visit.                  Assessment and Plan    Diagnoses and all orders for this visit:    1. Prostate cancer (Primary)           Prostate cancer          We discussed that active surveillance is an option for a patient with low risk prostate cancer.  The risks of surveillance include progression of disease, failure of clinical staging to detect advanced disease, need for strict followup, need for repeat prostate biopsies, and patient anxiety related to PSA.  We did discuss that the evidence suggests that patient's who progress to treatment on surveillance have survival similar to those treated at diagnosis.    Discussed MRI with the patient.  There was some artifact from surgical clips.  Discussed with general surgery they do think it will fall off in the next few months we will plan a repeat MRI prostate and PSA in 6 months

## 2024-06-24 ENCOUNTER — OFFICE VISIT (OUTPATIENT)
Dept: UROLOGY | Facility: CLINIC | Age: 79
End: 2024-06-24
Payer: MEDICARE

## 2024-06-24 VITALS — HEIGHT: 67 IN | BODY MASS INDEX: 24.48 KG/M2 | WEIGHT: 156 LBS | RESPIRATION RATE: 16 BRPM

## 2024-06-24 DIAGNOSIS — C61 PROSTATE CANCER: Primary | ICD-10-CM

## 2024-06-24 PROCEDURE — 1160F RVW MEDS BY RX/DR IN RCRD: CPT | Performed by: UROLOGY

## 2024-06-24 PROCEDURE — 1159F MED LIST DOCD IN RCRD: CPT | Performed by: UROLOGY

## 2024-06-24 PROCEDURE — 99213 OFFICE O/P EST LOW 20 MIN: CPT | Performed by: UROLOGY

## 2024-07-16 ENCOUNTER — TELEPHONE (OUTPATIENT)
Dept: SURGERY | Facility: CLINIC | Age: 79
End: 2024-07-16
Payer: MEDICARE

## 2024-07-16 DIAGNOSIS — C61 PROSTATE CANCER: Primary | ICD-10-CM

## 2024-07-16 NOTE — TELEPHONE ENCOUNTER
Patient's wife is calling stating that they need a refill of the patient's medication taken for his prostate. Patient's wife is unsure of the name of this medication. Pharmacy was verified. She is requesting a long supply of medication when refilled.

## 2024-07-16 NOTE — TELEPHONE ENCOUNTER
PATIENT'S WIFE CALLED BACK TO CHECK ON REQUEST FOR MEDICATION REFILLS.  SHE SAID HE HAS PROSTATITIS AND IT IS A MEDICATION FOR THIS.  SHE ASKED FOR HIM TO BE ABLE TO GET MORE THAN ONE REFILL.

## 2024-07-17 RX ORDER — DOXYCYCLINE HYCLATE 100 MG/1
100 CAPSULE ORAL 2 TIMES DAILY
Qty: 42 CAPSULE | Refills: 0 | Status: SHIPPED | OUTPATIENT
Start: 2024-07-17 | End: 2024-08-07

## 2024-08-09 ENCOUNTER — HOSPITAL ENCOUNTER (EMERGENCY)
Facility: HOSPITAL | Age: 79
Discharge: HOME | End: 2024-08-09
Attending: EMERGENCY MEDICINE
Payer: MEDICARE

## 2024-08-09 VITALS
WEIGHT: 158.73 LBS | DIASTOLIC BLOOD PRESSURE: 64 MMHG | OXYGEN SATURATION: 100 % | RESPIRATION RATE: 16 BRPM | BODY MASS INDEX: 24.91 KG/M2 | SYSTOLIC BLOOD PRESSURE: 132 MMHG | HEIGHT: 67 IN | HEART RATE: 75 BPM | TEMPERATURE: 98.4 F

## 2024-08-09 DIAGNOSIS — I95.1 ORTHOSTATIC HYPOTENSION: Primary | ICD-10-CM

## 2024-08-09 LAB
ALBUMIN SERPL-MCNC: 4 G/DL (ref 3.5–5.2)
ALBUMIN/GLOB SERPL: 1.7 G/DL
ALP SERPL-CCNC: 76 U/L (ref 39–117)
ALT SERPL W P-5'-P-CCNC: 16 U/L (ref 1–41)
ANION GAP SERPL CALCULATED.3IONS-SCNC: 12.7 MMOL/L (ref 5–15)
AST SERPL-CCNC: 22 U/L (ref 1–40)
BASOPHILS # BLD AUTO: 0.03 10*3/MM3 (ref 0–0.2)
BASOPHILS NFR BLD AUTO: 0.2 % (ref 0–1.5)
BILIRUB SERPL-MCNC: 1.3 MG/DL (ref 0–1.2)
BUN SERPL-MCNC: 20 MG/DL (ref 8–23)
BUN/CREAT SERPL: 15.4 (ref 7–25)
CALCIUM SPEC-SCNC: 9.1 MG/DL (ref 8.6–10.5)
CHLORIDE SERPL-SCNC: 103 MMOL/L (ref 98–107)
CO2 SERPL-SCNC: 22.3 MMOL/L (ref 22–29)
CREAT SERPL-MCNC: 1.3 MG/DL (ref 0.76–1.27)
DEPRECATED RDW RBC AUTO: 47.1 FL (ref 37–54)
EGFRCR SERPLBLD CKD-EPI 2021: 55.9 ML/MIN/1.73
EOSINOPHIL # BLD AUTO: 0.01 10*3/MM3 (ref 0–0.4)
EOSINOPHIL NFR BLD AUTO: 0.1 % (ref 0.3–6.2)
ERYTHROCYTE [DISTWIDTH] IN BLOOD BY AUTOMATED COUNT: 13.2 % (ref 12.3–15.4)
GLOBULIN UR ELPH-MCNC: 2.3 GM/DL
GLUCOSE BLDC GLUCOMTR-MCNC: 136 MG/DL (ref 70–99)
GLUCOSE SERPL-MCNC: 119 MG/DL (ref 65–99)
HCT VFR BLD AUTO: 43.3 % (ref 37.5–51)
HGB BLD-MCNC: 14.2 G/DL (ref 13–17.7)
HOLD SPECIMEN: NORMAL
HOLD SPECIMEN: NORMAL
IMM GRANULOCYTES # BLD AUTO: 0.08 10*3/MM3 (ref 0–0.05)
IMM GRANULOCYTES NFR BLD AUTO: 0.6 % (ref 0–0.5)
LYMPHOCYTES # BLD AUTO: 0.62 10*3/MM3 (ref 0.7–3.1)
LYMPHOCYTES NFR BLD AUTO: 4.5 % (ref 19.6–45.3)
MCH RBC QN AUTO: 31.5 PG (ref 26.6–33)
MCHC RBC AUTO-ENTMCNC: 32.8 G/DL (ref 31.5–35.7)
MCV RBC AUTO: 96 FL (ref 79–97)
MONOCYTES # BLD AUTO: 0.6 10*3/MM3 (ref 0.1–0.9)
MONOCYTES NFR BLD AUTO: 4.3 % (ref 5–12)
NEUTROPHILS NFR BLD AUTO: 12.5 10*3/MM3 (ref 1.7–7)
NEUTROPHILS NFR BLD AUTO: 90.3 % (ref 42.7–76)
NRBC BLD AUTO-RTO: 0 /100 WBC (ref 0–0.2)
PLATELET # BLD AUTO: 198 10*3/MM3 (ref 140–450)
PMV BLD AUTO: 10.7 FL (ref 6–12)
POTASSIUM SERPL-SCNC: 3.8 MMOL/L (ref 3.5–5.2)
PROT SERPL-MCNC: 6.3 G/DL (ref 6–8.5)
QT INTERVAL: 387 MS
QTC INTERVAL: 444 MS
RBC # BLD AUTO: 4.51 10*6/MM3 (ref 4.14–5.8)
SODIUM SERPL-SCNC: 138 MMOL/L (ref 136–145)
TROPONIN T SERPL HS-MCNC: 21 NG/L
WBC NRBC COR # BLD AUTO: 13.84 10*3/MM3 (ref 3.4–10.8)
WHOLE BLOOD HOLD COAG: NORMAL
WHOLE BLOOD HOLD SPECIMEN: NORMAL

## 2024-08-09 PROCEDURE — 36415 COLL VENOUS BLD VENIPUNCTURE: CPT

## 2024-08-09 PROCEDURE — 84484 ASSAY OF TROPONIN QUANT: CPT | Performed by: EMERGENCY MEDICINE

## 2024-08-09 PROCEDURE — 93010 ELECTROCARDIOGRAM REPORT: CPT | Performed by: INTERNAL MEDICINE

## 2024-08-09 PROCEDURE — 93005 ELECTROCARDIOGRAM TRACING: CPT | Performed by: EMERGENCY MEDICINE

## 2024-08-09 PROCEDURE — 85025 COMPLETE CBC W/AUTO DIFF WBC: CPT

## 2024-08-09 PROCEDURE — 93005 ELECTROCARDIOGRAM TRACING: CPT

## 2024-08-09 PROCEDURE — 25810000003 LACTATED RINGERS SOLUTION: Performed by: EMERGENCY MEDICINE

## 2024-08-09 PROCEDURE — 82948 REAGENT STRIP/BLOOD GLUCOSE: CPT

## 2024-08-09 PROCEDURE — 80053 COMPREHEN METABOLIC PANEL: CPT | Performed by: EMERGENCY MEDICINE

## 2024-08-09 PROCEDURE — 99284 EMERGENCY DEPT VISIT MOD MDM: CPT

## 2024-08-09 RX ORDER — SODIUM CHLORIDE 0.9 % (FLUSH) 0.9 %
10 SYRINGE (ML) INJECTION AS NEEDED
Status: DISCONTINUED | OUTPATIENT
Start: 2024-08-09 | End: 2024-08-09 | Stop reason: HOSPADM

## 2024-08-09 RX ADMIN — SODIUM CHLORIDE, POTASSIUM CHLORIDE, SODIUM LACTATE AND CALCIUM CHLORIDE 1000 ML: 600; 310; 30; 20 INJECTION, SOLUTION INTRAVENOUS at 13:39

## 2024-08-09 NOTE — DISCHARGE INSTRUCTIONS
Increase your daily fluid intake.  Decreased your strenuous activities.  Continue your home medications as prescribed.  Follow-up with your primary care provider in 1 week.  Return to the ER for development of chest pain, shortness of breath, lightheaded or dizziness, or any other concerns issues that may arise.

## 2024-08-09 NOTE — ED PROVIDER NOTES
Time: 3:23 PM EDT  Date of encounter:  8/9/2024  Independent Historian/Clinical History and Information was obtained by:   {Blank multiple:21261}  Chief Complaint: ***    History is limited by: {Limited History:54830}    History of Present Illness:  Patient is a 79 y.o. year old male who presents to the emergency department for evaluation of ***    HPI    Patient Care Team  Primary Care Provider: Matthew Sampson MD    Past Medical History:     Allergies   Allergen Reactions    Morphine Seizure     Past Medical History:   Diagnosis Date    Acid reflux     Hyperlipidemia     Hypertension     Hypothyroid      Past Surgical History:   Procedure Laterality Date    APPENDECTOMY      COLONOSCOPY N/A 2/14/2024    Procedure: COLONOSCOPY WITH ELEVIEW INJECTION, HOT SNARE POLYPECTOMY,CLIP APPLICATION X4;  Surgeon: Paul Suarez MD;  Location: [facility] ENDOSCOPY;  Service: General;  Laterality: N/A;  COLON POLYPS    HERNIA REPAIR       Family History   Problem Relation Age of Onset    Hypertension Mother     Hypertension Father     Hypertension Sister        Home Medications:  Prior to Admission medications    Medication Sig Start Date End Date Taking? Authorizing Provider   levothyroxine (SYNTHROID, LEVOTHROID) 75 MCG tablet Take 1 tablet by mouth Daily. 6/5/23  Yes Yury Early MD   lisinopril (PRINIVIL,ZESTRIL) 20 MG tablet Take 1 tablet by mouth Daily. 6/12/23  Yes Yury Early MD   omeprazole (priLOSEC) 20 MG capsule Take 1 capsule by mouth Daily.   Yes Yury Early MD   pantoprazole (PROTONIX) 40 MG EC tablet Take 1 tablet by mouth Daily. 6/5/23  Yes Yury Early MD   simvastatin (ZOCOR) 10 MG tablet simvastatin 10 mg oral tablet take 1 tablet (10 mg) by oral route once daily in the evening   Active   Yes Yury Early MD        Social History:   Social History     Tobacco Use    Smoking status: Former     Types: Pipe    Smokeless tobacco: Never   Vaping Use    Vaping  "status: Never Used   Substance Use Topics    Alcohol use: Yes     Comment: occ.    Drug use: Never         Review of Systems:  Review of Systems     ***    Physical Exam:  /73   Pulse 73   Temp 97.9 °F (36.6 °C) (Oral)   Resp 18   Ht 170.2 cm (67\")   Wt 72 kg (158 lb 11.7 oz)   SpO2 98%   BMI 24.86 kg/m²     Physical Exam    Vital signs were reviewed under triage note.  General appearance - Patient appears well-developed and well-nourished.  Patient is in no acute distress.  Head - Normocephalic, atraumatic.  Pupils - Equal, round, reactive to light.  Extraocular muscles are intact.  Conjunctiva is clear.  Nasal - Normal inspection.  No evidence of trauma or epistaxis.  Tympanic membranes - Gray, intact without erythema or retractions.  Oral mucosa - Pink and moist without lesions or erythema.  Uvula is midline.  Chest wall - Atraumatic.  Chest wall is nontender.  There are no vesicular rashes noted.  Neck - Supple.  Trachea was midline.  There is no palpable lymphadenopathy or thyromegaly.  There are no meningeal signs  Lungs - Clear to auscultation and percussion bilaterally.  Heart - Regular rate and rhythm without any murmurs, clicks, or gallops.  Abdomen - Soft.  Bowel sounds are present.  There is no palpable tenderness.  There is no rebound, guarding, or rigidity.  There are no palpable masses.  There are no pulsatile masses.  Back - Spine is straight and midline.  There is no CVA tenderness.  Extremities - Intact x4 with full range of motion.  There is no palpable edema.  Pulses are intact x4 and equal.  Neurologic - Patient is awake, alert, and oriented x3.  Cranial nerves II through XII are grossly intact.  Motor and sensory functions grossly intact.  Cerebellar function was normal.  Integument - There are no rashes.  There are no petechia or purpura lesions noted.  There are no vesicular lesions noted.           ***    Procedures:  Procedures      Medical Decision Making:      Comorbidities " that affect care:    {Comorbidities that affect care:87243}    External Notes reviewed:    {External Note review (Optional):21412}      The following orders were placed and all results were independently analyzed by me:  Orders Placed This Encounter   Procedures    [place] Draw    Comprehensive Metabolic Panel    CBC Auto Differential    Single High Sensitivity Troponin T    NPO Diet NPO Type: Strict NPO    Undress & Gown    Continuous Pulse Oximetry    Vital Signs    Orthostatic Blood Pressure    Oxygen Therapy- Nasal Cannula; Titrate 1-6 LPM Per SpO2; 90 - 95%    POC Glucose Once    ECG 12 Lead ED Triage Standing Order; Weak / Dizzy / AMS    Insert Peripheral IV    Fall Precautions    CBC & Differential    Green Top (Gel)    Lavender Top    Gold Top - SST    Light Blue Top       Medications Given in the Emergency Department:  Medications   sodium chloride 0.9 % flush 10 mL (has no administration in time range)   lactated ringers bolus 1,000 mL (1,000 mL Intravenous New Bag 8/9/24 1339)        ED Course:    ED Course as of 08/09/24 1526   Fri Aug 09, 2024   1522 EKG performed at 1151 was interpreted by me to show a normal sinus rhythm with a ventricular of 79 bpm.  The UT interval is 136 ms.  P waves are normal.  QRS interval is 121 ms.  Patient has intraventricular conduction delay.  Axis is leftward at -47 degrees.  There is no acute ischemic ST or T wave changes identified.  QT corrected was 444 ms. [TB]      ED Course User Index  [TB] Forrest Rhodes DO       The patient was seen and evaluated the ED by me.  The above history and physical examination was performed as documented.  Diagnostic data was obtained.  Results reviewed.  Findings were discussed with the patient.  Patient was noted be orthostatic hypotensive and symptomatic as well.  Patient's blood pressure at sitting was 140/66 with a heart rate of 84.  Upon standing the patient dropped blood pressure down to 128/70 and heart rate increased to 91.  Patient  was given a liter of IV fluids.  ED workup is otherwise unremarkable.  Patient denies any current symptoms.  Patient be discharged home with outpatient treatment follow-up.    Labs:    Lab Results (last 24 hours)       Procedure Component Value Units Date/Time    POC Glucose Once [[id number]]  (Abnormal) Collected: 08/09/24 1148    Specimen: Blood Updated: 08/09/24 1150     Glucose 136 mg/dL      Comment: Serial Number: [id number]:  [id number]       CBC & Differential [[id number]]  (Abnormal) Collected: 08/09/24 1202    Specimen: Blood Updated: 08/09/24 1213    Narrative:      The following orders were created for panel order CBC & Differential.  Procedure                               Abnormality         Status                     ---------                               -----------         ------                     CBC Auto Differential[[id number]]        Abnormal            Final result                 Please view results for these tests on the individual orders.    Comprehensive Metabolic Panel [[id number]]  (Abnormal) Collected: 08/09/24 1202    Specimen: Blood Updated: 08/09/24 1233     Glucose 119 mg/dL      BUN 20 mg/dL      Creatinine 1.30 mg/dL      Sodium 138 mmol/L      Potassium 3.8 mmol/L      Comment: Slight hemolysis detected by analyzer. Result may be falsely elevated.        Chloride 103 mmol/L      CO2 22.3 mmol/L      Calcium 9.1 mg/dL      Total Protein 6.3 g/dL      Albumin 4.0 g/dL      ALT (SGPT) 16 U/L      AST (SGOT) 22 U/L      Alkaline Phosphatase 76 U/L      Total Bilirubin 1.3 mg/dL      Globulin 2.3 gm/dL      A/G Ratio 1.7 g/dL      BUN/Creatinine Ratio 15.4     Anion Gap 12.7 mmol/L      eGFR 55.9 mL/min/1.73     Narrative:      GFR Normal >60  Chronic Kidney Disease <60  Kidney Failure <15    The GFR formula is only valid for adults with stable renal function between ages 18 and 70.    CBC Auto Differential [[id number]]  (Abnormal) Collected: 08/09/24 1202    Specimen: Blood  Updated: 08/09/24 1213     WBC 13.84 10*3/mm3      RBC 4.51 10*6/mm3      Hemoglobin 14.2 g/dL      Hematocrit 43.3 %      MCV 96.0 fL      MCH 31.5 pg      MCHC 32.8 g/dL      RDW 13.2 %      RDW-SD 47.1 fl      MPV 10.7 fL      Platelets 198 10*3/mm3      Neutrophil % 90.3 %      Lymphocyte % 4.5 %      Monocyte % 4.3 %      Eosinophil % 0.1 %      Basophil % 0.2 %      Immature Grans % 0.6 %      Neutrophils, Absolute 12.50 10*3/mm3      Lymphocytes, Absolute 0.62 10*3/mm3      Monocytes, Absolute 0.60 10*3/mm3      Eosinophils, Absolute 0.01 10*3/mm3      Basophils, Absolute 0.03 10*3/mm3      Immature Grans, Absolute 0.08 10*3/mm3      nRBC 0.0 /100 WBC     Single High Sensitivity Troponin T [[id number]]  (Normal) Collected: 08/09/24 1202    Specimen: Blood Updated: 08/09/24 1329     HS Troponin T 21 ng/L     Narrative:      High Sensitive Troponin T Reference Range:  <14.0 ng/L- Negative Female for AMI  <22.0 ng/L- Negative Male for AMI  >=14 - Abnormal Female indicating possible myocardial injury.  >=22 - Abnormal Male indicating possible myocardial injury.   Clinicians would have to utilize clinical acumen, EKG, Troponin, and serial changes to determine if it is an Acute Myocardial Infarction or myocardial injury due to an underlying chronic condition.                  Imaging:    No Radiology Exams Resulted Within Past 24 Hours      Differential Diagnosis and Discussion:    {Differentials:39324}    {Independent Review of (Optional):12537}    MDM     Amount and/or Complexity of Data Reviewed  Clinical lab tests: reviewed  Tests in the medicine section of CPT®: reviewed         {Critical Care:56522}    Patient Care Considerations:    {Considerations (Optional):94182}      Consultants/Shared Management Plan:    {Shared Management Plan (Optional):76273}    Social Determinants of Health:    {Social Determinants of Health (Optional):44531}      Disposition and Care Coordination:    {Admission  consideration:73981}    {Discharge (Optional):29414}    Final diagnoses:   Orthostatic hypotension        ED Disposition       ED Disposition   Discharge    Condition   Stable    Comment   --               This medical record created using voice recognition software.           appropriate for discharge from the emergency department.      The patient will pursue further outpatient evaluation with the primary care physician or other designated or consulting physician as outlined in the discharge instructions. They are agreeable to this plan of care and follow-up instructions have been explained in detail. The patient has received these instructions in written format and has expressed an understanding of the discharge instructions. The patient is aware that any significant change in condition or worsening of symptoms should prompt an immediate return to this or the closest emergency department or call to 911.    Final diagnoses:   Orthostatic hypotension        ED Disposition       ED Disposition   Discharge    Condition   Stable    Comment   --               This medical record created using voice recognition software.             Forrest Rhodes DO  08/13/24 6704

## 2024-09-23 ENCOUNTER — OFFICE VISIT (OUTPATIENT)
Dept: CARDIOLOGY | Facility: CLINIC | Age: 79
End: 2024-09-23
Payer: MEDICARE

## 2024-09-23 VITALS
WEIGHT: 155 LBS | HEIGHT: 67 IN | HEART RATE: 54 BPM | BODY MASS INDEX: 24.33 KG/M2 | DIASTOLIC BLOOD PRESSURE: 80 MMHG | SYSTOLIC BLOOD PRESSURE: 150 MMHG

## 2024-09-23 DIAGNOSIS — I10 HYPERTENSION, ESSENTIAL: ICD-10-CM

## 2024-09-23 DIAGNOSIS — I47.19 PAT (PAROXYSMAL ATRIAL TACHYCARDIA): ICD-10-CM

## 2024-09-23 DIAGNOSIS — R00.2 PALPITATIONS: Primary | ICD-10-CM

## 2024-09-23 PROCEDURE — 1160F RVW MEDS BY RX/DR IN RCRD: CPT | Performed by: INTERNAL MEDICINE

## 2024-09-23 PROCEDURE — 1159F MED LIST DOCD IN RCRD: CPT | Performed by: INTERNAL MEDICINE

## 2024-09-23 PROCEDURE — 99204 OFFICE O/P NEW MOD 45 MIN: CPT | Performed by: INTERNAL MEDICINE

## 2024-09-23 RX ORDER — FAMOTIDINE 40 MG/1
1 TABLET, FILM COATED ORAL DAILY
COMMUNITY
Start: 2024-09-17

## 2024-09-23 RX ORDER — METOPROLOL SUCCINATE 25 MG/1
1 TABLET, EXTENDED RELEASE ORAL DAILY
COMMUNITY
Start: 2024-09-17

## 2024-09-24 ENCOUNTER — TELEPHONE (OUTPATIENT)
Dept: UROLOGY | Facility: CLINIC | Age: 79
End: 2024-09-24
Payer: MEDICARE

## 2024-09-26 ENCOUNTER — PATIENT ROUNDING (BHMG ONLY) (OUTPATIENT)
Dept: CARDIOLOGY | Facility: CLINIC | Age: 79
End: 2024-09-26
Payer: MEDICARE

## 2024-10-24 ENCOUNTER — OFFICE VISIT (OUTPATIENT)
Dept: CARDIOLOGY | Facility: CLINIC | Age: 79
End: 2024-10-24
Payer: MEDICARE

## 2024-10-24 VITALS
HEART RATE: 61 BPM | BODY MASS INDEX: 24.33 KG/M2 | SYSTOLIC BLOOD PRESSURE: 160 MMHG | DIASTOLIC BLOOD PRESSURE: 82 MMHG | HEIGHT: 67 IN | WEIGHT: 155 LBS

## 2024-10-24 DIAGNOSIS — R00.2 PALPITATIONS: Primary | ICD-10-CM

## 2024-10-24 DIAGNOSIS — I10 HYPERTENSION, ESSENTIAL: ICD-10-CM

## 2024-10-24 DIAGNOSIS — I47.19 PAT (PAROXYSMAL ATRIAL TACHYCARDIA): ICD-10-CM

## 2024-10-24 RX ORDER — LISINOPRIL 20 MG/1
20 TABLET ORAL DAILY
Qty: 90 TABLET | Refills: 3 | Status: SHIPPED | OUTPATIENT
Start: 2024-10-24

## 2024-10-24 NOTE — PROGRESS NOTES
Chief Complaint  4 week follow up , PAT, and Hypertension    Subjective            Kennedy Vince Cruz presents to [facility] CARDIOLOGY  History of Present Illness    Mr. Cruz is here for follow-up evaluation management of palpitations, paroxysmal atrial tachycardia, and essential hypertension.  He is here today for a blood pressure check.  His blood pressure has remained elevated at home recently around 150 systolic.  Recent Holter monitor showed multiple episodes of rapid atrial tachycardia, after starting beta-blocker he has had no further symptoms.  Echo and stress test normal last year.    PMH  Past Medical History:   Diagnosis Date    Acid reflux     Hyperlipidemia     Hypertension     Hypothyroid          SURGICALHX  Past Surgical History:   Procedure Laterality Date    APPENDECTOMY      COLONOSCOPY N/A 2/14/2024    Procedure: COLONOSCOPY WITH ELEVIEW INJECTION, HOT SNARE POLYPECTOMY,CLIP APPLICATION X4;  Surgeon: Paul Suarez MD;  Location: [facility] ENDOSCOPY;  Service: General;  Laterality: N/A;  COLON POLYPS    HERNIA REPAIR          SOC  Social History     Socioeconomic History    Marital status:    Tobacco Use    Smoking status: Former     Types: Pipe    Smokeless tobacco: Never   Vaping Use    Vaping status: Never Used   Substance and Sexual Activity    Alcohol use: Yes     Comment: occ.    Drug use: Never    Sexual activity: Defer         FAMHX  Family History   Problem Relation Age of Onset    Hypertension Mother     Hypertension Father     Hypertension Sister           ALLERGY  Allergies   Allergen Reactions    Morphine Seizure        MEDSCURRENT    Current Outpatient Medications:     famotidine (PEPCID) 40 MG tablet, Take 1 tablet by mouth Daily., Disp: , Rfl:     levothyroxine (SYNTHROID, LEVOTHROID) 75 MCG tablet, Take 1 tablet by mouth Daily., Disp: , Rfl:     lisinopril (PRINIVIL,ZESTRIL) 20 MG tablet, Take 1 tablet by mouth Daily., Disp: 90 tablet, Rfl: 3     "metoprolol succinate XL (TOPROL-XL) 25 MG 24 hr tablet, Take 1 tablet by mouth Daily., Disp: , Rfl:     omeprazole (priLOSEC) 20 MG capsule, Take 1 capsule by mouth Daily., Disp: , Rfl:     pantoprazole (PROTONIX) 40 MG EC tablet, Take 1 tablet by mouth Daily., Disp: , Rfl:     simvastatin (ZOCOR) 10 MG tablet, simvastatin 10 mg oral tablet take 1 tablet (10 mg) by oral route once daily in the evening   Active, Disp: , Rfl:       Review of Systems   Cardiovascular:  Negative for chest pain, dyspnea on exertion and palpitations.        Objective     /82   Pulse 61   Ht 170.2 cm (67\")   Wt 70.3 kg (155 lb)   BMI 24.28 kg/m²       General Appearance:   well developed  well nourished  HENT:   oropharynx moist  lips not cyanotic  Neck:  thyroid not enlarged  supple  Respiratory:  no respiratory distress  normal breath sounds  no rales  Cardiovascular:  no jugular venous distention  regular rhythm  apical impulse normal  S1 normal, S2 normal  no S3, no S4   no murmur  no rub, no thrill  carotid pulses normal; no bruit  pedal pulses normal  lower extremity edema: none    Musculoskeletal:  no clubbing of fingers.   normocephalic, head atraumatic  Skin:   warm, dry  Psychiatric:  judgement and insight appropriate  normal mood and affect      Result Review :     The following data was reviewed by: JEREMIE Morin on 10/24/2024:    CMP          8/9/2024    12:02   CMP   Glucose 119    BUN 20    Creatinine 1.30    EGFR 55.9    Sodium 138    Potassium 3.8    Chloride 103    Calcium 9.1    Total Protein 6.3    Albumin 4.0    Globulin 2.3    Total Bilirubin 1.3    Alkaline Phosphatase 76    AST (SGOT) 22    ALT (SGPT) 16    Albumin/Globulin Ratio 1.7    BUN/Creatinine Ratio 15.4    Anion Gap 12.7      CBC          8/9/2024    12:02   CBC   WBC 13.84    RBC 4.51    Hemoglobin 14.2    Hematocrit 43.3    MCV 96.0    MCH 31.5    MCHC 32.8    RDW 13.2    Platelets 198                 Procedures      Kennedy Clarke " Nancy  reports that he has quit smoking. His smoking use included pipe. He has never used smokeless tobacco. I have educated him on the risk of diseases from using tobacco products such as cancer, COPD, and heart disease.              Assessment and Plan        ASSESSMENT:  Encounter Diagnoses   Name Primary?    Palpitations Yes    PAT (paroxysmal atrial tachycardia)     Hypertension, essential          PLAN:    1.  Essential hypertension, uncontrolled.  Increase lisinopril to 20 mg daily.  Continue metoprolol.  He will send a message in 2 weeks with his blood pressure average.  Will make further adjustments from there if necessary.  2.  Palpitations secondary to paroxysmal atrial tachycardia, no further symptoms since starting beta-blocker therapy, continue.  3.  Will follow-up on blood pressure in 2 weeks otherwise can follow-up annually.          Patient was given instructions and counseling regarding his condition or for health maintenance advice. Please see specific information pulled into the AVS if appropriate.           Rabia Sanchez, APRN   10/24/2024  12:43 EDT

## 2024-12-06 ENCOUNTER — TELEPHONE (OUTPATIENT)
Dept: UROLOGY | Age: 79
End: 2024-12-06
Payer: MEDICARE

## 2024-12-06 DIAGNOSIS — C61 PROSTATE CANCER: Primary | ICD-10-CM

## 2024-12-06 RX ORDER — DOXYCYCLINE 100 MG/1
100 CAPSULE ORAL DAILY
Qty: 21 CAPSULE | Refills: 0 | Status: SHIPPED | OUTPATIENT
Start: 2024-12-06 | End: 2024-12-27

## 2024-12-06 NOTE — TELEPHONE ENCOUNTER
PT BELIEVES HE HAS PROSTATITIS AGAIN AND IS REQUESTING THAT SOMETHING BE SENT IN. STATES THAT HE GETS IT OFTEN AND WAS TOLD TO REPORT IT WHEN IT FLARES UP.

## 2024-12-06 NOTE — TELEPHONE ENCOUNTER
Spoke to patient, I informed patient that prescription has been sent in. Doxycycline 100mg twice daily for 3 weeks. Patient verbalized understanding of information. Pharmacy verified.

## 2024-12-13 PROBLEM — N41.1 CHRONIC PROSTATITIS: Status: ACTIVE | Noted: 2024-12-13

## 2024-12-13 NOTE — PROGRESS NOTES
Chief Complaint: Urologic complaint    Subjective         History of Present Illness  Kennedy Cruz is a 79 y.o. male           Prostatic adenocarcinoma clinical T1c        Follows up after MRI.      12/6/2024 called in with prostatitis symptoms -doxycycline 100 mg p.o. twice daily x 3 weeks  -ended up not taking these as his symptoms abated.  Doing okay currently    7/16/2024 patient called in having prostatitis symptoms.  Doxycycline 100 mg p.o. twice daily x 3 weeks.       Voiding okay.      No straining.  No prostate meds  Nocturia x 2    No GH    No cardiopulmonary history  Non-smoker  No anticoagulation            Previous    5/14/2024 called in with prostatitis -doxycycline 100 mg p.o. twice daily x 3 weeks -symptoms abated      10/23 CT chest with - multifocal groundglass infiltrates lower lobe right  6/23 CT abdomen/pelvis with -    diverticulitis without abscess      No history of kidney  stone.    Uncle with prostate cancer in    Recurrent prostatitis more in the last few years -causes pelvic pain -usually takes a couple months of antibiotics to fix this  Patient has history of recurrent prostatitis, he did have a lot of trouble after his last biopsy    2/15 excision of left epididymal cyst    Several uncles lived to be in their 80s             Prostate CA    12/24    0.5     12/24 MRI prostate 46 g, PSAd 0.1 - negative    6/12/2024 General surgery/George stated clips would probably  fall off  in a few months    6/24 MRI prostate - 45 g, PSAd 0.1 -negative, but a lot of artifact causing limitation from endoscopic clips in the colon.  MRI is not a good option for future screening examination on this patient  3/24    4.9  4/23    3.9  5/22 prostate biopsy -31 g  - first urology -right apex-3+3  3/22   5.4  11/16   3.7  10/15 3.2            Objective     Past Medical History:   Diagnosis Date    Acid reflux     Hyperlipidemia     Hypertension     Hypothyroid                              Assessment  and Plan    Diagnoses and all orders for this visit:    1. Prostate cancer (Primary)    2. Chronic prostatitis           Prostate cancer      We discussed that active surveillance is an option for a patient with low risk prostate cancer.  The risks of surveillance include progression of disease, failure of clinical staging to detect advanced disease, need for strict followup, need for repeat prostate biopsies, and patient anxiety related to PSA.  We did discuss that the evidence suggests that patient's who progress to treatment on surveillance have survival similar to those treated at diagnosis.      MRI looked better without artifact this time.  Negative.  Patient given reassurance PSA density within normal limits.  PSA much lower, we discussed I do think some of his PSA elevation was possibly from prostatitis.  Patient voiced understanding.  This time we will take a very conservative approach to his active surveillance.  No biopsy at this time.      Follow-up in 6 months with PSA        Chronic prostatitis      Patient is very prone to prostatitis we will just treat as a, usually clears up pretty quick with antibiotics.

## 2024-12-16 ENCOUNTER — OFFICE VISIT (OUTPATIENT)
Dept: UROLOGY | Age: 79
End: 2024-12-16
Payer: MEDICARE

## 2024-12-16 VITALS — HEIGHT: 67 IN | BODY MASS INDEX: 24.67 KG/M2 | WEIGHT: 157.2 LBS

## 2024-12-16 DIAGNOSIS — C61 PROSTATE CANCER: Primary | ICD-10-CM

## 2024-12-16 DIAGNOSIS — N41.1 CHRONIC PROSTATITIS: ICD-10-CM

## 2024-12-16 PROCEDURE — 1160F RVW MEDS BY RX/DR IN RCRD: CPT | Performed by: UROLOGY

## 2024-12-16 PROCEDURE — 99213 OFFICE O/P EST LOW 20 MIN: CPT | Performed by: UROLOGY

## 2024-12-16 PROCEDURE — 1159F MED LIST DOCD IN RCRD: CPT | Performed by: UROLOGY

## 2024-12-16 RX ORDER — TRIAMCINOLONE ACETONIDE 0.1 %
1 PASTE (GRAM) DENTAL 2 TIMES DAILY
COMMUNITY
Start: 2024-10-14

## 2025-05-30 ENCOUNTER — LAB (OUTPATIENT)
Dept: LAB | Facility: HOSPITAL | Age: 80
End: 2025-05-30
Payer: MEDICARE

## 2025-05-30 DIAGNOSIS — C61 PROSTATE CANCER: ICD-10-CM

## 2025-05-30 LAB — PSA SERPL-MCNC: 3.91 NG/ML (ref 0–4)

## 2025-05-30 PROCEDURE — 36415 COLL VENOUS BLD VENIPUNCTURE: CPT

## 2025-05-30 PROCEDURE — 84153 ASSAY OF PSA TOTAL: CPT

## 2025-06-11 NOTE — PROGRESS NOTES
Chief Complaint: Urologic complaint    Subjective         History of Present Illness  Kennedy Cruz is a 80 y.o. male           Prostatic adenocarcinoma clinical T1c          Voiding okay.      No straining.  No prostate meds  Nocturia x 2    No recent prostatitis    No GH/ UTI    No cardiopulmonary history  Non-smoker  No anticoagulation            Previous        12/6/2024 called in with prostatitis symptoms -doxycycline 100 mg p.o. twice daily x 3 weeks  -ended up not taking these as his symptoms abated.  Doing okay currently  5/14/2024 called in with prostatitis -doxycycline 100 mg p.o. twice daily x 3 weeks -symptoms abated      10/23 CT chest with - multifocal groundglass infiltrates lower lobe right  6/23 CT abdomen/pelvis with -    diverticulitis without abscess      No history of kidney  stone.    Uncle with prostate cancer in    Recurrent prostatitis more in the last few years -causes pelvic pain -usually takes a couple months of antibiotics to fix this  Patient has history of recurrent prostatitis, he did have a lot of trouble after his last biopsy    2/15 excision of left epididymal cyst    Several uncles lived to be in their 80s             Prostate CA    5/25      3.9  12/24    0.5     12/24 MRI prostate 46 g, PSAd 0.1 - negative    6/12/2024 General surgery/George stated clips would probably  fall off  in a few months    6/24 MRI prostate - 45 g, PSAd 0.1 -negative, but a lot of artifact causing limitation from endoscopic clips in the colon.  MRI is not a good option for future screening examination on this patient  3/24    4.9  4/23    3.9  5/22 prostate biopsy -31 g  - first urology -right apex-3+3  3/22   5.4  11/16   3.7  10/15 3.2            Objective     Past Medical History:   Diagnosis Date    Acid reflux     Hyperlipidemia     Hypertension     Hypothyroid                              Assessment and Plan    Diagnoses and all orders for this visit:    1. Prostate cancer (Primary)            Prostate cancer      We discussed that active surveillance is an option for a patient with low risk prostate cancer.  The risks of surveillance include progression of disease, failure of clinical staging to detect advanced disease, need for strict followup, need for repeat prostate biopsies, and patient anxiety related to PSA.  We did discuss that the evidence suggests that patient's who progress to treatment on surveillance have survival similar to those treated at diagnosis.      PSA stable.   Because of patient's low risk disease stable PSA and age at this time we will take a very conservative approach to his active surveillance.  No biopsy at this time        Follow-up in 6 months with PSA and MRI prostate

## 2025-06-16 ENCOUNTER — OFFICE VISIT (OUTPATIENT)
Dept: UROLOGY | Age: 80
End: 2025-06-16
Payer: MEDICARE

## 2025-06-16 VITALS — WEIGHT: 157 LBS | BODY MASS INDEX: 24.64 KG/M2 | HEIGHT: 67 IN

## 2025-06-16 DIAGNOSIS — C61 PROSTATE CANCER: Primary | ICD-10-CM

## (undated) DEVICE — GLV SURG BIOGEL LTX PF 7 1/2

## (undated) DEVICE — SNAR E/S POLYP SNAREMASTER OVL/10MM 2.8X2300MM YEL

## (undated) DEVICE — CONN JET HYDRA H20 AUXILIARY DISP

## (undated) DEVICE — SOL IRR NACL 0.9PCT BT 1000ML

## (undated) DEVICE — SOL IRRG H2O PL/BG 1000ML STRL

## (undated) DEVICE — SOLIDIFIER LIQLOC PLS 1500CC BT

## (undated) DEVICE — NDL INJ GI MIDDLE/BVL 25G 2.8MM 230CM

## (undated) DEVICE — LINER SURG CANSTR SXN S/RIGD 1500CC

## (undated) DEVICE — Device

## (undated) DEVICE — INJ SUB/MUCUS ELEVIEW FOR/GI/ENDO/PROC AMPL/10ML

## (undated) DEVICE — KT VLV BIOP DEFENDO SXN AIR/WATER

## (undated) DEVICE — KT SYR GEL ORISE SNGL PK 10ML

## (undated) DEVICE — PAD GRND SUREFIT DUAL W/CORD A/ 15FT

## (undated) DEVICE — TRAP POLYP ETRAP 2PK